# Patient Record
Sex: MALE | Race: BLACK OR AFRICAN AMERICAN
[De-identification: names, ages, dates, MRNs, and addresses within clinical notes are randomized per-mention and may not be internally consistent; named-entity substitution may affect disease eponyms.]

---

## 2019-11-23 ENCOUNTER — HOSPITAL ENCOUNTER (EMERGENCY)
Dept: HOSPITAL 88 - FSED | Age: 29
Discharge: HOME | End: 2019-11-23
Payer: SELF-PAY

## 2019-11-23 VITALS — WEIGHT: 250 LBS | BODY MASS INDEX: 37.89 KG/M2 | HEIGHT: 68 IN

## 2019-11-23 DIAGNOSIS — I10: ICD-10-CM

## 2019-11-23 DIAGNOSIS — M25.562: Primary | ICD-10-CM

## 2019-11-23 DIAGNOSIS — M25.422: ICD-10-CM

## 2019-11-23 DIAGNOSIS — M70.42: ICD-10-CM

## 2019-11-23 PROCEDURE — 99283 EMERGENCY DEPT VISIT LOW MDM: CPT
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
## 2020-11-20 ENCOUNTER — HOSPITAL ENCOUNTER (EMERGENCY)
Dept: HOSPITAL 88 - FSED | Age: 30
Discharge: HOME | End: 2020-11-20
Payer: SELF-PAY

## 2020-11-20 VITALS — HEIGHT: 68 IN | BODY MASS INDEX: 31.52 KG/M2 | WEIGHT: 208 LBS
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
## 2020-11-20 DIAGNOSIS — M54.2: ICD-10-CM
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
## 2020-11-20 DIAGNOSIS — I10: ICD-10-CM

## 2020-11-20 DIAGNOSIS — M25.512: Primary | ICD-10-CM
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
## 2020-11-20 PROCEDURE — 72040 X-RAY EXAM NECK SPINE 2-3 VW: CPT

## 2020-11-20 PROCEDURE — 99284 EMERGENCY DEPT VISIT MOD MDM: CPT

## 2020-11-20 PROCEDURE — 73030 X-RAY EXAM OF SHOULDER: CPT

## 2020-11-20 PROCEDURE — 72125 CT NECK SPINE W/O DYE: CPT

## 2020-11-20 PROCEDURE — 71046 X-RAY EXAM CHEST 2 VIEWS: CPT

## 2020-11-20 NOTE — DIAGNOSTIC IMAGING REPORT
X-ray left shoulder multiple views



History: Fall



Findings: No acute fracture, subluxation, significant soft tissue abnormality.



Impression: as above.



Signed by: Ashok Mclain MD on 11/20/2020 2:30 PM

## 2020-11-20 NOTE — EMERGENCY DEPARTMENT NOTE
History of Present Illnes


History of Present Illness


Chief Complaint:  Extremity Trauma/Pain


History of Present Illness


This is a 30 year old  male with cc of left shoulder pain s/p 

trauma yesterday afternoon. Patient was one of two people carrying TV down 

stairs. Patient was positioned on the lower end of the stairs carrying the TV 

when the individual on positioned toward the top of the stairs lost his  

causing the patient to fall backwards down 5 flights of stairs landing on his 

back and then the TV landing on his left shoulder. Pain worse upon awakening 

this AM. Pain with ROM of left shoulder above 90 degress. No numbness, tingling,

weakness. Denies any other injuries. Did not hit head. No LOC.


Historian:  Patient, Family Member


Arrival Mode:  Car


 Required:  No


Onset (how long ago):  day(s) (yesterday)


Location:  left shoulder


Quality:  dull


Radiation:  Reports non-radiation


Severity:  moderate


Onset quality:  sudden


Duration (how long):  day(s)


Timing of current episode:  constant


Progression:  worsening (since awakening this AM)


Chronicity:  new


Context:  Reports trauma/injury; 


   Denies recent illness


Relieving factors:  none


Exacerbating factors:  movement


Associated symptoms:  Denies confusion, Denies chest pain, Denies cough, Denies 

diaphoresis, Denies fever/chills, Denies headaches, Denies loss of appetite, 

Denies malaise, Denies nausea/vomiting, Denies rash





Past Medical/Family History


Physician Review


I have reviewed the patient's past medical and family history.  Any updates have

been documented here.





Past Medical History


Recent Fever:  No


Clinical Suspicion of Infectio:  No


New/Unexplained Change in Ment:  No


Past Medical History:  Hypertension


Past Surgical History:  None





Social History


Smoking Cessation:  Unknown if ever smoked


Counseling Performed:  No


Alcohol Use:  Occasional


Any Illegal Drug Use:  No





Other


Last Tetanus:  UTD


Any Pre-Existing Lines (PICC,:  No





Review of Systems


Review of Systems


Constitutional:  Denies chills, Denies diaphoresis, Denies fever, Denies 

weakness


EENTM:  Denies nose congestion, Denies throat pain


Cardiovascular:  Denies chest pain, Denies edema, Denies palpitations, Denies 

syncope


Respiratory:  Denies cough, Denies dyspnea


Gastrointestinal:  Denies diarrhea


Genitourinary:  Denies dysuria


Musculoskeletal:  Reports neck pain (left posterior/lateral neck)


Integumentary:  Denies lumps, Denies rash


Neurological:  Denies headache, Denies numbness, Denies paresthesia, Denies pre-

existing deficit


Endocrine:  Denies increased urination


Hematological/Lymphatic:  Denies easy bruising





Physical Exam


Related Data


Allergies:  


Coded Allergies:  


     No Known Allergies (Unverified , 11/23/19)


Triage Vital Signs





Vital Signs








  Date Time  Temp Pulse Resp B/P (MAP) Pulse Ox O2 Delivery O2 Flow Rate FiO2


 


11/20/20 13:00 98.1 56 18 145/81 100 Room Air  











Physical Exam


CONSTITUTIONAL





Constitutional:  Present well-developed, Present well-nourished


HENT


HENT:  Present normocephalic, Present atraumatic, Present oropharynx 

clear/moist, Present nose normal


HENT L/R:  Present left ext ear normal, Present right ext ear normal


EYES





Eyes:  Reports PERRL, Reports conjunctivae normal


NECK


Neck:  Present other (neck with no tenderness and no midline stepoffs. Pain with

limited ROM of neck to left due to pain. + Spasm left trapezius); 


   Absent cervical adenopathy


PULMONARY


Pulmonary:  Present effort normal, Present breath sounds normal, Present chest 

tenderness (Diffuse over left lateral chest just under axilla. No rib step offs 

or point tenderness.)


CARDIOVASCULAR





Cardiovascular:  Present regular rhythm, Present heart sounds normal, Present 

capillary refill normal, Present normal rate


GASTROINTESTINAL





Abdominal:  Present soft, Present nontender, Present bowel sounds normal


GENITOURINARY





SKIN


Skin:  Present warm, Present dry


MUSCULOSKELETAL





Musculoskeletal:  Present other (left wrist/elbow with FROM and non tender. Left

hand, forearm, and upper arm nontender. Left shoulder with FROM, but pain with 

ROM above 90 degress. Left shoulder with diffuse tenderness. No swelling. No 

deformity.)


NEUROLOGICAL





Neurological:  Present alert, Present oriented x 3, Present no gross motor or 

sensory deficits


PSYCHOLOGICAL


Psychological:  Present mood/affect normal, Present judgement normal





Results


Imaging


Imaging results reviewed:  Yes


Imaging Comments


X-ray chest 2 views





History: Fell flight of stairs yesterday. Chest pain





Comparison: None





Findings: 


Central airways: Unremarkable. 


Heart: Normal size. 


Mediastinal silhouettes: Unremarkable. 


Pleural spaces: No pleural effusion or pneumothorax. 


Lungs: No significant focal lung disease. 


Skeletal structures: There seems to be a compression fracture of the T11


vertebra body. 


Extrathoracic soft tissues: Unremarkable.


There is also metallic object with a cylindrical portion and then crown like


radiating projections at one and superimposed on the midline chest posteriorly


in close relationship with the wedged vertebral body. The exact nature is


uncertain.


Impression: As above. Most historical details and additional imaging may be


considered.





Signed by: Ashok Mclain MD on 11/20/2020 2:02 PM








X-ray C-spine 2 views





History: Fall. Pain left side of the neck and chest.





Comparison: None





Findings: C-spine series consisting of bilateral view, AP view, open mouth


odontoid view, swimmer's view.





There is slight straightening of the C-spine lordosis. C7-T1 junction is not


seen in its entirety on the lateral view. There is no abnormal prevertebral


soft tissue swelling. The spinal and spinal laminar lines are intact. There is


a concern about increased density of the vertebral body at C6 level. A


compression is less likely but not entirely ruled out.





The swimmer's view is suboptimal as the cervicothoracic junction is obscured by


the clavicles and one swimmer's view by the humerus.





The open-mouth odontoid view is suboptimal. The atlantoaxial articulation is


intact but the odontoid is not fully evaluated.





No other incidental findings.





Impression: Technically limited C-spine series. If there is a strong concern


about C-spine injury clinically, oblique views of the C-spine or preferably a


C-spine CT with reconstructions should be considered.





Signed by: Ashok Mclain MD on 11/20/2020 2:25 PM





X-ray left shoulder multiple views





History: Fall





Findings: No acute fracture, subluxation, significant soft tissue abnormality.





Impression: as above.





Signed by: Ashok Mclain MD on 11/20/2020 2:30 PM





CT C-SPINE W/O - [facility]





HISTORY: Trauma, pain





COMPARISON:  Cervical spine radiographs 11/20/2020





TECHNIQUE: CT of the cervical spine without contrast.  Sagittal and coronal


reformations were created.  One or more of the following dose reduction


techniques were used: Automated exposure control, adjustment of the mA and/or


kV according to patient size, and/or utilization of iterative reconstruction


technique.





FINDINGS:  


   


Cervical lordosis is straightened.


There is no scoliosis or subluxation.





Small corticated defect in the right anterior arch of C1 may be congenital or


from remote/chronic trauma. Similar corticated defect in the posterior arch of


C1 also may be congenital or from remote/chronic trauma.





Otherwise, no definite acute fractures, compression deformity, or destructive


osseous lesions are seen.  


The craniocervical junction is intact. 


No gross spinal canal masses are seen. 


The paravertebral and paraspinal soft tissues are unremarkable. 





Degenerative changes: The disc spaces are preserved with minimal spondylotic


changes. 





Incidental findings:


The partially imaged adenoid tonsils are prominent.





IMPRESSION:  


1.  Small corticated defects (2 total) in the anterior and posterior arch of C1


may be congenital or from remote/chronic trauma.


2.  Otherwise, no acute osseous abnormalities. 


3.  Cannot exclude ligament, spinal cord and or vascular abnormalities on the


basis of this examination.





Signed by: Dr. Thom Cherry M.D. on 11/20/2020 3:47 PM








Dictated By: THOM CHERRY MD


Electronically Signed By: THOM CHERRY MD on 11/20/20 1547


Transcribed By: MAYA on 11/20/20 1547





Assessment & Plan


Medical Decision Making


MDM


Differential dx includes, but not limited to: dislocation, fracture, contusion, 

sprain, strain, rotator cuff injury. CT c-spine negative. Patient with no 

mindline tenderness and FROM of neck after Toradol. Spoke with patient at length

about limitations of CT scan and gave strict return precautions and patient to 

have prompt followup.





Reassessment


Reassessment


1500 pain much improved and FROM of neck after Toradol. Awoke patient from 

sleeping at D/C. Pain further improved after Flexeril.





Assessment & Plan


Final Impression:  


(1) Fall (on) (from) other stairs and steps, initial encounter


(2) Shoulder pain, acute


Depart Disposition:  HOME, SELF-CARE


Last Vital Signs











  Date Time  Temp Pulse Resp B/P (MAP) Pulse Ox O2 Delivery O2 Flow Rate FiO2


 


11/20/20 13:00 98.1 56 18 145/81 100 Room Air  








Home Meds


Active Scripts


Cyclobenzaprine Hcl (CYCLOBENZAPRINE HCL) 10 Mg Tablet, 10 MG PO TID, #14 TAB


   Prov:MOOSE KUMAR MD         11/20/20


Medications in the ED





Ketorolac Tromethamine 60 mg ONCE  ONCE IM ;  Start 11/20/20 at 13:15;  Stop 

11/20/20 at 13:16;  Status DC


Ketorolac Tromethamine 60 mg STK-MED ONCE .ROUTE ;  Start 11/20/20 at 13:51;  

Stop 11/20/20 at 13:45;  Status DC











MOOSE KUMAR MD              Nov 20, 2020 14:16

## 2020-11-20 NOTE — DIAGNOSTIC IMAGING REPORT
X-ray chest 2 views



History: Fell flight of stairs yesterday. Chest pain



Comparison: None



Findings: 

Central airways: Unremarkable. 

Heart: Normal size. 

Mediastinal silhouettes: Unremarkable. 

Pleural spaces: No pleural effusion or pneumothorax. 

Lungs: No significant focal lung disease. 

Skeletal structures: There seems to be a compression fracture of the T11

vertebra body. 

Extrathoracic soft tissues: Unremarkable.

There is also metallic object with a cylindrical portion and then crown like

radiating projections at one and superimposed on the midline chest posteriorly

in close relationship with the wedged vertebral body. The exact nature is

uncertain.

Impression: As above. Most historical details and additional imaging may be

considered.



Signed by: Ashok Mclain MD on 11/20/2020 2:02 PM

## 2020-11-20 NOTE — DIAGNOSTIC IMAGING REPORT
CT C-SPINE W/O - [facility]



HISTORY: Trauma, pain



COMPARISON:  Cervical spine radiographs 11/20/2020



TECHNIQUE: CT of the cervical spine without contrast.  Sagittal and coronal

reformations were created.  One or more of the following dose reduction

techniques were used: Automated exposure control, adjustment of the mA and/or

kV according to patient size, and/or utilization of iterative reconstruction

technique.



FINDINGS:  

   

Cervical lordosis is straightened.

There is no scoliosis or subluxation.



Small corticated defect in the right anterior arch of C1 may be congenital or

from remote/chronic trauma. Similar corticated defect in the posterior arch of

C1 also may be congenital or from remote/chronic trauma.



Otherwise, no definite acute fractures, compression deformity, or destructive

osseous lesions are seen.  

The craniocervical junction is intact. 

No gross spinal canal masses are seen. 

The paravertebral and paraspinal soft tissues are unremarkable. 



Degenerative changes: The disc spaces are preserved with minimal spondylotic

changes. 



Incidental findings:

The partially imaged adenoid tonsils are prominent.



IMPRESSION:  

1.  Small corticated defects (2 total) in the anterior and posterior arch of C1

may be congenital or from remote/chronic trauma.

2.  Otherwise, no acute osseous abnormalities. 

3.  Cannot exclude ligament, spinal cord and or vascular abnormalities on the

basis of this examination.



Signed by: Dr. Tohm Leal M.D. on 11/20/2020 3:47 PM
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
## 2020-11-20 NOTE — DIAGNOSTIC IMAGING REPORT
X-ray C-spine 2 views



History: Fall. Pain left side of the neck and chest.



Comparison: None



Findings: C-spine series consisting of bilateral view, AP view, open mouth

odontoid view, swimmer's view.



There is slight straightening of the C-spine lordosis. C7-T1 junction is not

seen in its entirety on the lateral view. There is no abnormal prevertebral

soft tissue swelling. The spinal and spinal laminar lines are intact. There is

a concern about increased density of the vertebral body at C6 level. A

compression is less likely but not entirely ruled out.



The swimmer's view is suboptimal as the cervicothoracic junction is obscured by

the clavicles and one swimmer's view by the humerus.



The open-mouth odontoid view is suboptimal. The atlantoaxial articulation is

intact but the odontoid is not fully evaluated.



No other incidental findings.



Impression: Technically limited C-spine series. If there is a strong concern

about C-spine injury clinically, oblique views of the C-spine or preferably a

C-spine CT with reconstructions should be considered.



Signed by: Ashok Mclain MD on 11/20/2020 2:25 PM
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
